# Patient Record
Sex: FEMALE | Race: ASIAN | NOT HISPANIC OR LATINO | URBAN - METROPOLITAN AREA
[De-identification: names, ages, dates, MRNs, and addresses within clinical notes are randomized per-mention and may not be internally consistent; named-entity substitution may affect disease eponyms.]

---
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
## 2023-01-04 ENCOUNTER — APPOINTMENT (OUTPATIENT)
Dept: PEDIATRIC NEPHROLOGY | Facility: CLINIC | Age: 18
End: 2023-01-04
Payer: MEDICAID

## 2023-01-04 VITALS
HEIGHT: 64.96 IN | DIASTOLIC BLOOD PRESSURE: 74 MMHG | WEIGHT: 152.5 LBS | HEART RATE: 90 BPM | BODY MASS INDEX: 25.41 KG/M2 | TEMPERATURE: 98.06 F | SYSTOLIC BLOOD PRESSURE: 107 MMHG

## 2023-01-04 PROCEDURE — 81003 URINALYSIS AUTO W/O SCOPE: CPT | Mod: QW

## 2023-01-04 PROCEDURE — 99214 OFFICE O/P EST MOD 30 MIN: CPT | Mod: 25

## 2023-01-04 NOTE — REASON FOR VISIT
[Follow-Up] : a follow-up visit for [Nephritis] : nephritis [Systemic Lupus Erythematosus] : systemic lupus erythematosus [Patient] : patient

## 2023-01-10 LAB
CREAT SPEC-SCNC: 217 MG/DL
CREAT/PROT UR: 0.1 RATIO
PROT UR-MCNC: 31 MG/DL

## 2023-01-11 ENCOUNTER — NON-APPOINTMENT (OUTPATIENT)
Age: 18
End: 2023-01-11

## 2023-01-17 NOTE — PHYSICAL EXAM
[Well Developed] : well developed [Well Nourished] : well nourished [Normal] : alert, oriented as age-appropriate, affect appropriate; no weakness, no facial asymmetry, moves all extremities normal gait- child older than 18 months [de-identified] : + striae on abdomen, arms, axillary areas; + acanthosis nigricans behind neck.

## 2023-01-30 ENCOUNTER — APPOINTMENT (OUTPATIENT)
Dept: PEDIATRIC RHEUMATOLOGY | Facility: CLINIC | Age: 18
End: 2023-01-30
Payer: MEDICAID

## 2023-01-30 VITALS
HEART RATE: 103 BPM | WEIGHT: 147.71 LBS | DIASTOLIC BLOOD PRESSURE: 74 MMHG | BODY MASS INDEX: 24.91 KG/M2 | HEIGHT: 64.61 IN | TEMPERATURE: 209.12 F | SYSTOLIC BLOOD PRESSURE: 109 MMHG

## 2023-01-30 DIAGNOSIS — G47.8 OTHER SLEEP DISORDERS: ICD-10-CM

## 2023-01-30 PROCEDURE — 99215 OFFICE O/P EST HI 40 MIN: CPT

## 2023-01-30 RX ORDER — PREDNISONE 2.5 MG/1
2.5 TABLET ORAL
Qty: 60 | Refills: 2 | Status: COMPLETED | COMMUNITY
Start: 2022-10-03 | End: 2023-01-30

## 2023-01-30 RX ORDER — FAMOTIDINE 20 MG/1
20 TABLET, FILM COATED ORAL
Qty: 30 | Refills: 5 | Status: COMPLETED | COMMUNITY
Start: 2022-10-12 | End: 2023-01-30

## 2023-01-31 LAB
ALBUMIN SERPL ELPH-MCNC: 4.2 G/DL
ALP BLD-CCNC: 48 U/L
ALT SERPL-CCNC: 9 U/L
ANION GAP SERPL CALC-SCNC: 13 MMOL/L
AST SERPL-CCNC: 12 U/L
BASOPHILS # BLD AUTO: 0 K/UL
BASOPHILS NFR BLD AUTO: 0 %
BILIRUB SERPL-MCNC: 0.2 MG/DL
BUN SERPL-MCNC: 10 MG/DL
C3 SERPL-MCNC: 157 MG/DL
C4 SERPL-MCNC: 32 MG/DL
CALCIUM SERPL-MCNC: 9.8 MG/DL
CHLORIDE SERPL-SCNC: 105 MMOL/L
CO2 SERPL-SCNC: 20 MMOL/L
CREAT SERPL-MCNC: 0.69 MG/DL
CRP SERPL-MCNC: 7 MG/L
EGFR: 128 ML/MIN/1.73M2
EOSINOPHIL # BLD AUTO: 0.01 K/UL
EOSINOPHIL NFR BLD AUTO: 0.2 %
ERYTHROCYTE [SEDIMENTATION RATE] IN BLOOD BY WESTERGREN METHOD: 19 MM/HR
GLUCOSE SERPL-MCNC: 91 MG/DL
HCT VFR BLD CALC: 34.8 %
HGB BLD-MCNC: 10.5 G/DL
IMM GRANULOCYTES NFR BLD AUTO: 0.3 %
LYMPHOCYTES # BLD AUTO: 1.67 K/UL
LYMPHOCYTES NFR BLD AUTO: 26.2 %
MAN DIFF?: NORMAL
MCHC RBC-ENTMCNC: 24.2 PG
MCHC RBC-ENTMCNC: 30.2 GM/DL
MCV RBC AUTO: 80.4 FL
MONOCYTES # BLD AUTO: 0.38 K/UL
MONOCYTES NFR BLD AUTO: 6 %
NEUTROPHILS # BLD AUTO: 4.3 K/UL
NEUTROPHILS NFR BLD AUTO: 67.3 %
PLATELET # BLD AUTO: 448 K/UL
POTASSIUM SERPL-SCNC: 4.1 MMOL/L
PROT SERPL-MCNC: 6.4 G/DL
RBC # BLD: 4.33 M/UL
RBC # FLD: 13.3 %
SODIUM SERPL-SCNC: 139 MMOL/L
WBC # FLD AUTO: 6.38 K/UL

## 2023-02-01 ENCOUNTER — APPOINTMENT (OUTPATIENT)
Dept: GASTROENTEROLOGY | Facility: CLINIC | Age: 18
End: 2023-02-01
Payer: MEDICAID

## 2023-02-01 ENCOUNTER — NON-APPOINTMENT (OUTPATIENT)
Age: 18
End: 2023-02-01

## 2023-02-01 VITALS
DIASTOLIC BLOOD PRESSURE: 75 MMHG | SYSTOLIC BLOOD PRESSURE: 122 MMHG | HEART RATE: 93 BPM | TEMPERATURE: 207.86 F | WEIGHT: 145 LBS | HEIGHT: 64.61 IN | BODY MASS INDEX: 24.45 KG/M2 | OXYGEN SATURATION: 99 %

## 2023-02-01 DIAGNOSIS — Z78.9 OTHER SPECIFIED HEALTH STATUS: ICD-10-CM

## 2023-02-01 LAB — DSDNA AB SER-ACNC: <12 IU/ML

## 2023-02-01 PROCEDURE — 99204 OFFICE O/P NEW MOD 45 MIN: CPT

## 2023-02-01 NOTE — ASSESSMENT
[FreeTextEntry1] : Impression:\par 1. Nausea/vomiting - differential includes gastroparesis, intraluminal obstructive process, eosinophilic gastroenteritis, vs less likely lupus enteritis\par 2. SLE on immunosuppression\par \par Plan:\par -Zofran PRN as it worked in ER IV; advised patient to use it PRN\par -Small meals throughout the day\par -Will plan first for EGD to r/o structural lesions, then gastric emptying to r/o gastroparesis\par -Risks and benefits of upper endoscopy including but not limited to bleeding, infection, missed lesions, perforation, injury to internal organs, anesthesia/drug side effects were discussed with patient. All questions answered. Patient is agreeable to the procedure. Advised patient that if she's nauseous the day of exam, she might require general anesthesia\par -Obtain also CT A/P to rule out small bowel/colonic thickening/inflammation/signs of enteritis

## 2023-02-01 NOTE — HISTORY OF PRESENT ILLNESS
[FreeTextEntry1] : This is a 17 year old female with SLE diagnosed 11/2020, lupus nephritis, HTN, who presents for evaluation of nausea/vomiting.\par \par The patient is referred by rheumatology for evaluation of GERD, nausea. For the last 4-5 months, she has been having persistent nausea and vomiting. She reports the nausea is all day long, and she feels nauseous even in the morning. It has been more at night, around 5PM. She takes her meds mostly in the morning, and she's nauseous even before taking the mycophenolate. She reports associated diarrhea. She reports recent very dark stools, twice a day. It's watery. No hematochezia. She might have lost 10 lb recently in the last few months. No dysphagia. She just don't like eating because of the nausea. No abdominal pain. No headaches, double vision. She has never had endoscopic evaluation before. She was given Pepcid and omeprazole, Zofran. Zofran helped for 5 hours if given IV. No change with the Pepcid/PPI. No recent medication changes, she was changed to the coated mycophenolate which did not help. No supplements, no OTC NSAIDs. No marijuana use. \par \par 1/30/23: CRP 7\par Na 139, K 4.1, Cl 105, bicarb 20, glucose 91, BUN 10, Cr 0.69, T protein 6.4, albumin 4.2, Ca 9.8,  Tbili 0.2, AST 12, ALT 9, ALP 48\par WBC 6.3, hgb 10.5, Hct 34.8, \par ESR 19\par C3 157\par C4 32\par Anti DsDNA <12 [de-identified] : Abd U/S 4/3/21:\par FINDINGS:\par \par Liver: Within normal limits.\par Bile ducts: Normal caliber. Common bile duct measures 3 mm.\par Gallbladder: Within normal limits.\par Pancreas: Visualized portions are within normal limits.\par Spleen: 9.53 cm. Within normal limits.\par Right kidney: 11.1 cm. No hydronephrosis.\par Left kidney: 10.8 cm.  No hydronephrosis.\par Ascites: None.\par Aorta and IVC: Visualized portions are within normal limits.\par \par IMPRESSION:\par \par No acute pathology.\par  [de-identified] : CT angio head 12/13/20:\par FINDINGS:\par \par Head CT: There is no acute intracranial hemorrhage, mass effect, shift of the midline structures, herniation, extra-axial fluid collection, or hydrocephalus.\par \par Scattered mucosal thickening and opacification are seen throughout the paranasal sinuses. The mastoid air cells are clear. The orbits are unremarkable. The calvarium is intact.\par \par CT venogram head: The superior, inferior, straight, confluence of, transverse, and sigmoid sinuses are patent. There is no evidence of venous sinus thrombosis. A rounded low-attenuation filling defect within the distal left transverse sinus is compatible with an arachnoid granulation.\par \par IMPRESSION:\par \par Head CT: No acute intracranial hemorrhage, mass effect, or shift of the midline structures.\par \par CT venogram head: No evidence of venous sinus thrombosis.

## 2023-02-01 NOTE — PHYSICAL EXAM
[Alert] : alert [Normal Voice/Communication] : normal voice/communication [Sclera] : the sclera and conjunctiva were normal [Hearing Threshold Finger Rub Not [place]] : hearing was normal [Normal Appearance] : the appearance of the neck was normal [No Respiratory Distress] : no respiratory distress [No Acc Muscle Use] : no accessory muscle use [Respiration, Rhythm And Depth] : normal respiratory rhythm and effort [Heart Rate And Rhythm] : heart rate was normal and rhythm regular [Normal S1, S2] : normal S1 and S2 [Bowel Sounds] : normal bowel sounds [Abdomen Tenderness] : non-tender [No Masses] : no abdominal mass palpated [Abdomen Soft] : soft [No Spinal Tenderness] : no spinal tenderness [Abnormal Walk] : normal gait [Cranial Nerves Intact] : cranial nerves 2-12 were intact [No Focal Deficits] : no focal deficits [Oriented To Time, Place, And Person] : oriented to person, place, and time [Normal Affect] : the affect was normal [de-identified] : +striae [de-identified] : +striae on arms

## 2023-02-01 NOTE — CONSULT LETTER
[Dear  ___] : Dear  [unfilled], [Consult Letter:] : I had the pleasure of evaluating your patient, [unfilled]. [Please see my note below.] : Please see my note below. [Consult Closing:] : Thank you very much for allowing me to participate in the care of this patient.  If you have any questions, please do not hesitate to contact me. [Sincerely,] : Sincerely, [FreeTextEntry2] : Dr. Cindy Brown [FreeTextEntry3] : Chapo Kim MD\par [facility]\par  of Medicine, [facility] [place]\par Tel: [phone]\par Fax: [phone]\par

## 2023-02-06 LAB — SARS-COV-2 N GENE NPH QL NAA+PROBE: NOT DETECTED

## 2023-02-07 ENCOUNTER — RESULT REVIEW (OUTPATIENT)
Age: 18
End: 2023-02-07

## 2023-02-07 ENCOUNTER — OUTPATIENT (OUTPATIENT)
Dept: OUTPATIENT SERVICES | Facility: HOSPITAL | Age: 18
LOS: 1 days | End: 2023-02-07
Payer: MEDICAID

## 2023-02-07 ENCOUNTER — APPOINTMENT (OUTPATIENT)
Dept: GASTROENTEROLOGY | Facility: HOSPITAL | Age: 18
End: 2023-02-07

## 2023-02-07 ENCOUNTER — TRANSCRIPTION ENCOUNTER (OUTPATIENT)
Age: 18
End: 2023-02-07

## 2023-02-07 VITALS
RESPIRATION RATE: 20 BRPM | DIASTOLIC BLOOD PRESSURE: 91 MMHG | HEART RATE: 82 BPM | SYSTOLIC BLOOD PRESSURE: 123 MMHG | OXYGEN SATURATION: 100 %

## 2023-02-07 VITALS
HEART RATE: 67 BPM | OXYGEN SATURATION: 100 % | HEIGHT: 66 IN | WEIGHT: 149.91 LBS | RESPIRATION RATE: 15 BRPM | TEMPERATURE: 97 F | SYSTOLIC BLOOD PRESSURE: 108 MMHG | DIASTOLIC BLOOD PRESSURE: 75 MMHG

## 2023-02-07 DIAGNOSIS — R11.2 NAUSEA WITH VOMITING, UNSPECIFIED: ICD-10-CM

## 2023-02-07 PROBLEM — G47.8 POOR SLEEP PATTERN: Status: ACTIVE | Noted: 2022-12-20

## 2023-02-07 PROCEDURE — 94640 AIRWAY INHALATION TREATMENT: CPT

## 2023-02-07 PROCEDURE — 43239 EGD BIOPSY SINGLE/MULTIPLE: CPT

## 2023-02-07 PROCEDURE — 88305 TISSUE EXAM BY PATHOLOGIST: CPT

## 2023-02-07 PROCEDURE — 88305 TISSUE EXAM BY PATHOLOGIST: CPT | Mod: 26

## 2023-02-07 RX ORDER — HYDROXYCHLOROQUINE SULFATE 200 MG
1 TABLET ORAL
Qty: 0 | Refills: 0 | DISCHARGE

## 2023-02-07 RX ORDER — MYCOPHENOLATE MOFETIL 250 MG/1
2 CAPSULE ORAL
Qty: 0 | Refills: 0 | DISCHARGE

## 2023-02-07 RX ORDER — SODIUM CHLORIDE 9 MG/ML
500 INJECTION INTRAMUSCULAR; INTRAVENOUS; SUBCUTANEOUS
Refills: 0 | Status: COMPLETED | OUTPATIENT
Start: 2023-02-07 | End: 2023-02-07

## 2023-02-07 RX ORDER — LIDOCAINE HCL 20 MG/ML
4 VIAL (ML) INJECTION ONCE
Refills: 0 | Status: COMPLETED | OUTPATIENT
Start: 2023-02-07 | End: 2023-02-07

## 2023-02-07 RX ADMIN — Medication 4 MILLILITER(S): at 09:20

## 2023-02-07 RX ADMIN — SODIUM CHLORIDE 30 MILLILITER(S): 9 INJECTION INTRAMUSCULAR; INTRAVENOUS; SUBCUTANEOUS at 09:19

## 2023-02-07 NOTE — ASU DISCHARGE PLAN (ADULT/PEDIATRIC) - NS MD DC FALL RISK RISK
For information on Fall & Injury Prevention, visit: [email or web address] OR  [email or web address] OR  https://www.cdc.gov/steadi/patient.html

## 2023-02-07 NOTE — ASU PATIENT PROFILE, ADULT - FALL HARM RISK - UNIVERSAL INTERVENTIONS
Bed in lowest position, wheels locked, appropriate side rails in place/Call bell, personal items and telephone in reach/Instruct patient to call for assistance before getting out of bed or chair/Non-slip footwear when patient is out of bed/[place] to call system/Physically safe environment - no spills, clutter or unnecessary equipment/Purposeful Proactive Rounding/Room/bathroom lighting operational, light cord in reach

## 2023-02-07 NOTE — PRE PROCEDURE NOTE - PRE PROCEDURE EVALUATION
Attending Physician: Andi Kim MD    Procedure: EGD    Indication for Procedure: Nausea/vomiting  ________________________________________________________  PAST MEDICAL & SURGICAL HISTORY:  Systemic lupus erythematosus, unspecified SLE type, unspecified organ involvement status      SHERWIN positive      No significant past surgical history        ALLERGIES:  No Known Allergies    HOME MEDICATIONS:  mycophenolate mofetil 500 mg oral tablet: 2 tab(s) orally 2 times a day  Plaquenil 200 mg oral tablet: 1 tab(s) orally once a day    AICD/PPM: [ ] yes   [ x] no    PERTINENT LAB DATA:                      PHYSICAL EXAMINATION:    Height (cm): 167.6  Weight (kg): 68  BMI (kg/m2): 24.2  BSA (m2): 1.77T(C): 35.9  HR: 67  BP: 108/75  RR: 15  SpO2: 100%    Constitutional: NAD  HEENT: PERRLA, EOMI,    Neck:  No JVD  Respiratory: CTAB/L  Cardiovascular: S1 and S2  Gastrointestinal: BS+, soft, NT/ND  Extremities: No peripheral edema  Neurological: A/O x 3, no focal deficits  Psychiatric: Normal mood, normal affect  Skin: No rashes    ASA Class: I [ ]  II [x ]  III [ ]  IV [ ]    COMMENTS:    The patient is a suitable candidate for the planned procedure unless box checked [ ]  No, explain:

## 2023-02-08 NOTE — HISTORY OF PRESENT ILLNESS
[SHERWIN] : SHERWIN [ds-DNA] : ds-DNA [SSA] : SSA [FreeTextEntry1] : Luisa is here for follow-up today.\par \par Reports continued feeling of nausea in AM, usually is unable to eat until 5PM. These symptoms only occur on weekdays; on weekends, she awakens at noon and is fine. Changed to Myfortic from CellCept, thinks she is doing well with her medications in terms of these symptoms [myfortic is enteric coated]. Still has periods where she is ‘gagging’ during the day and has a decrease in appetite as she does not want to exacerbate these symptoms. Denies dysphagia, reflux symptoms/acidic after taste, any food triggers, emesis, diarrhea, constipation, or abdominal pain. \par \par Otherwise, reports feeling well without any additional symptoms.\par \par Current Meds - reports compliance with her medications \par Prednisone 2.5mg/dose\par HCQ 300mg qD\par Myfortic 720mg BID (no nausea symptoms around dosing)\par Lisinopril 30mg \par Ca-Vit D BID\par Vit D once a week \par Famotidine qD\par \par Denies any recent illnesses, COVID exposures, trauma/injury, fevers, rashes, oral lesions, Raynaud's, headaches, vision changes, chest pain, difficulty breathing, palpitations, hematuria, hematochezia, weakness, joint symptoms, or peripheral edema. Reports wearing sunscreen.\par  [SLEDXDATE] : 11/2020

## 2023-02-08 NOTE — PHYSICAL EXAM
[PERRLA] : DENILSON [Eyelids] : normal eyelids [Pupils] : pupils were equal and round [Gums] : normal gums [Mucosa] : moist and pink mucosa [Palate] : normal palate [S1, S2 Present] : S1, S2 present [Cardiac Auscultation] : normal cardiac auscultation  [Respiratory Effort] : normal respiratory effort [Clear to auscultation] : clear to auscultation [Soft] : soft [NonTender] : non tender [Non Distended] : non distended [Normal Bowel Sounds] : normal bowel sounds [No Hepatosplenomegaly] : no hepatosplenomegaly [No Abnormal Lymph Nodes Palpated] : no abnormal lymph nodes palpated [Muscle Strength] : normal muscle strength [Range Of Motion] : full range of motion [Gait] : normal gait [Cranial nerves grossly intact] : cranial nerves grossly intact [Intact Judgement] : intact judgement  [Insight Insight] : intact insight [1] : 1 [Thumbs bend back to reach forearm] : thumbs bend back to reach forearm [Hyperextension of elbows] : hyperextension of elbows  [Hyperextension of knees] : hyperextension of knees [Pronated flat feet] : pronated flat feet [Acute distress] : no acute distress [Rash] : no rash [Malar Erythema] : no malar erythema [Erythematous Conjunctiva] : nonerythematous conjunctiva [Erythematous Oropharynx] : nonerythematous oropharynx [Lesions] : no lesions [Murmurs] : no murmurs [Peripheral Edema] : no peripheral edema  [Joint effusions] : no joint effusions [FreeTextEntry1] : well-appearing, pleasant [de-identified] : +cushingoid features, moon facies; striae present along axillae and abdomen [de-identified] : no objective arthritis [NumbJointsActiveArthritis] : 0 [NumbJointsLimitedMotion] : 0 [de-identified] : FROM C-spine [de-identified] : Negative ONELIA bilaterally  [de-identified] : no gross discrepancy

## 2023-02-08 NOTE — CONSULT LETTER
[Dear  ___] : Dear  [unfilled], [Courtesy Letter:] : I had the pleasure of seeing your patient, [unfilled], in my office today. [Please see my note below.] : Please see my note below. [Consult Closing:] : Thank you very much for allowing me to participate in the care of this patient.  If you have any questions, please do not hesitate to contact me. [Sincerely,] : Sincerely, [FreeTextEntry2] : Dr. Camille Tamez\par [address] [facility] [facility] \[place] [address]\par [phone] [FreeTextEntry3] : Cindy Brown DO\par Attending Physician, Pediatric Rheumatology\par [facility]\par

## 2023-02-08 NOTE — REASON FOR VISIT
[Follow-Up: _____] : [unfilled] is  being seen for a [unfilled] follow-up visit [Patient] : patient [Other: _____] : [unfilled] [FreeTextEntry1] : f

## 2023-02-08 NOTE — REVIEW OF SYSTEMS
[NI] : Endocrine [Nl] : Hematologic/Lymphatic [Date of Last Ophto Exam: _____] : the patient's last Ophthalmology exam was [unfilled] [Menarche] : ~T menarche [Appropriate Age Development] : development appropriate for age [Wgt Loss (___ Lbs)] : recent [unfilled] lb weight loss [Change in Appetite] : change in appetite [Vomiting] : vomiting [Decrease In Appetite] : decreased appetite [Diarrhea] : no diarrhea [Constipation] : no constipation [Irregular Periods] : no irregular periods [Smokers in Home] : no one in home smokes [FreeTextEntry3] : abdominal and UE striae [FreeTextEntry2] : nausea

## 2023-02-10 LAB — SURGICAL PATHOLOGY STUDY: SIGNIFICANT CHANGE UP

## 2023-03-07 ENCOUNTER — APPOINTMENT (OUTPATIENT)
Dept: PEDIATRIC NEPHROLOGY | Facility: CLINIC | Age: 18
End: 2023-03-07

## 2023-03-13 ENCOUNTER — APPOINTMENT (OUTPATIENT)
Dept: PEDIATRIC RHEUMATOLOGY | Facility: CLINIC | Age: 18
End: 2023-03-13

## 2023-03-22 NOTE — ED ADULT NURSE NOTE - CAS TRG GEN SKIN COLOR
Normal for race What Type Of Note Output Would You Prefer (Optional)?: Bullet Format How Severe Is Your Skin Lesion?: moderate Is This A New Presentation, Or A Follow-Up?: Mole

## 2023-04-19 NOTE — ED PROVIDER NOTE - ATTENDING CONTRIBUTION TO CARE
home
I performed a history and physical exam of the patient and discussed their management with the resident. I reviewed the resident's note and agree with the documented findings and plan of care.  Kasandra Ohara MD

## 2023-07-10 ENCOUNTER — APPOINTMENT (OUTPATIENT)
Dept: PEDIATRIC RHEUMATOLOGY | Facility: CLINIC | Age: 18
End: 2023-07-10
Payer: MEDICAID

## 2023-07-10 VITALS
DIASTOLIC BLOOD PRESSURE: 69 MMHG | HEART RATE: 88 BPM | SYSTOLIC BLOOD PRESSURE: 102 MMHG | WEIGHT: 125 LBS | BODY MASS INDEX: 20.58 KG/M2 | HEIGHT: 65.2 IN

## 2023-07-10 DIAGNOSIS — Z87.898 PERSONAL HISTORY OF OTHER SPECIFIED CONDITIONS: ICD-10-CM

## 2023-07-10 DIAGNOSIS — R63.4 ABNORMAL WEIGHT LOSS: ICD-10-CM

## 2023-07-10 DIAGNOSIS — R19.8 OTHER SPECIFIED SYMPTOMS AND SIGNS INVOLVING THE DIGESTIVE SYSTEM AND ABDOMEN: ICD-10-CM

## 2023-07-10 DIAGNOSIS — Z91.14 PATIENT'S OTHER NONCOMPLIANCE WITH MEDICATION REGIMEN: ICD-10-CM

## 2023-07-10 PROCEDURE — 99215 OFFICE O/P EST HI 40 MIN: CPT

## 2023-07-11 LAB
25(OH)D3 SERPL-MCNC: 43.8 NG/ML
ALBUMIN SERPL ELPH-MCNC: 4.4 G/DL
ALP BLD-CCNC: 70 U/L
ALT SERPL-CCNC: 7 U/L
ANION GAP SERPL CALC-SCNC: 11 MMOL/L
AST SERPL-CCNC: 13 U/L
BILIRUB SERPL-MCNC: 0.4 MG/DL
BUN SERPL-MCNC: 10 MG/DL
C3 SERPL-MCNC: 156 MG/DL
C4 SERPL-MCNC: 28 MG/DL
CALCIUM SERPL-MCNC: 9.8 MG/DL
CARDIOLIPIN AB SER IA-ACNC: NEGATIVE
CHLORIDE SERPL-SCNC: 105 MMOL/L
CO2 SERPL-SCNC: 23 MMOL/L
CONFIRM: 32.7 SEC
CREAT SERPL-MCNC: 0.64 MG/DL
CRP SERPL-MCNC: 19 MG/L
DRVVT IMM 1:2 NP PPP: NORMAL
DRVVT SCREEN TO CONFIRM RATIO: 1.07 RATIO
DSDNA AB SER-ACNC: 20 IU/ML
EGFR: 130 ML/MIN/1.73M2
ENA RNP AB SER IA-ACNC: <0.2 AL
ENA SM AB SER IA-ACNC: <0.2 AL
ENA SS-A AB SER IA-ACNC: 3.6 AL
ENA SS-B AB SER IA-ACNC: <0.2 AL
ERYTHROCYTE [SEDIMENTATION RATE] IN BLOOD BY WESTERGREN METHOD: 30 MM/HR
FERRITIN SERPL-MCNC: 50 NG/ML
GLUCOSE SERPL-MCNC: 92 MG/DL
IRON SATN MFR SERPL: 5 %
IRON SERPL-MCNC: 17 UG/DL
POTASSIUM SERPL-SCNC: 3.8 MMOL/L
PROT SERPL-MCNC: 7.4 G/DL
SCREEN DRVVT: 41.2 SEC
SODIUM SERPL-SCNC: 139 MMOL/L
THYROGLOB AB SERPL-ACNC: <20 IU/ML
THYROPEROXIDASE AB SERPL IA-ACNC: <10 IU/ML
TIBC SERPL-MCNC: 327 UG/DL
TSH SERPL-ACNC: 1.4 UIU/ML
UIBC SERPL-MCNC: 310 UG/DL

## 2023-07-12 PROBLEM — R19.8 EPISODE OF GAGGING: Status: RESOLVED | Noted: 2022-12-20 | Resolved: 2023-07-12

## 2023-07-12 PROBLEM — Z87.898 HISTORY OF NAUSEA: Status: RESOLVED | Noted: 2022-10-12 | Resolved: 2023-07-12

## 2023-07-12 PROBLEM — R63.4 WEIGHT LOSS, UNINTENTIONAL: Status: ACTIVE | Noted: 2023-07-12

## 2023-07-12 PROBLEM — Z87.898 HISTORY OF FATIGUE: Status: RESOLVED | Noted: 2022-12-20 | Resolved: 2023-07-12

## 2023-07-12 PROBLEM — Z87.898 HISTORY OF NAUSEA AND VOMITING: Status: RESOLVED | Noted: 2023-02-01 | Resolved: 2023-07-12

## 2023-07-12 PROBLEM — Z91.14 NONCOMPLIANCE WITH MEDICATIONS: Status: ACTIVE | Noted: 2023-07-12

## 2023-07-12 LAB
B2 GLYCOPROT1 IGA SERPL IA-ACNC: <5 SAU
B2 GLYCOPROT1 IGG SER-ACNC: <5 SGU
B2 GLYCOPROT1 IGM SER-ACNC: <5 SMU
CARDIOLIPIN IGM SER-MCNC: 5.8 MPL
DEPRECATED CARDIOLIPIN IGA SER: <5 APL

## 2023-07-12 RX ORDER — PREDNISONE 5 MG/1
5 TABLET ORAL
Qty: 100 | Refills: 2 | Status: COMPLETED | COMMUNITY
Start: 2022-05-12 | End: 2023-07-12

## 2023-07-12 RX ORDER — ONDANSETRON 4 MG/1
4 TABLET ORAL
Qty: 15 | Refills: 1 | Status: COMPLETED | COMMUNITY
Start: 2022-12-12 | End: 2023-07-12

## 2023-07-12 RX ORDER — HYDROXYCHLOROQUINE SULFATE 200 MG/1
200 TABLET, FILM COATED ORAL DAILY
Qty: 135 | Refills: 2 | Status: ACTIVE | COMMUNITY
Start: 2021-04-05 | End: 1900-01-01

## 2023-07-12 RX ORDER — ONDANSETRON 4 MG/1
4 TABLET, ORALLY DISINTEGRATING ORAL EVERY 6 HOURS
Qty: 60 | Refills: 2 | Status: COMPLETED | COMMUNITY
Start: 2023-02-01 | End: 2023-07-12

## 2023-07-12 RX ORDER — CHOLECALCIFEROL (VITAMIN D3) 1250 MCG
1.25 MG CAPSULE ORAL
Qty: 6 | Refills: 2 | Status: COMPLETED | COMMUNITY
Start: 2022-04-14 | End: 2023-07-12

## 2023-07-12 NOTE — PHYSICAL EXAM
[PERRLA] : DENILSON [Eyelids] : normal eyelids [Pupils] : pupils were equal and round [Gums] : normal gums [Mucosa] : moist and pink mucosa [Palate] : normal palate [S1, S2 Present] : S1, S2 present [Cardiac Auscultation] : normal cardiac auscultation  [Respiratory Effort] : normal respiratory effort [Clear to auscultation] : clear to auscultation [Soft] : soft [NonTender] : non tender [Non Distended] : non distended [Normal Bowel Sounds] : normal bowel sounds [No Hepatosplenomegaly] : no hepatosplenomegaly [No Abnormal Lymph Nodes Palpated] : no abnormal lymph nodes palpated [Muscle Strength] : normal muscle strength [Range Of Motion] : full range of motion [Gait] : normal gait [Cranial nerves grossly intact] : cranial nerves grossly intact [Intact Judgement] : intact judgement  [Insight Insight] : intact insight [1] : 1 [Thumbs bend back to reach forearm] : thumbs bend back to reach forearm [Hyperextension of elbows] : hyperextension of elbows  [Hyperextension of knees] : hyperextension of knees [Pronated flat feet] : pronated flat feet [Acute distress] : no acute distress [Rash] : no rash [Malar Erythema] : no malar erythema [Erythematous Conjunctiva] : nonerythematous conjunctiva [Erythematous Oropharynx] : nonerythematous oropharynx [Lesions] : no lesions [Murmurs] : no murmurs [Peripheral Edema] : no peripheral edema  [Joint effusions] : no joint effusions [FreeTextEntry1] : well-appearing, weight loss apparent  [de-identified] : striae present along axillae, upper extremities, and abdomen [de-identified] : no objective arthritis [NumbJointsActiveArthritis] : 0 [NumbJointsLimitedMotion] : 0 [de-identified] : FROM C-spine [de-identified] : Negative ONELIA bilaterally  [de-identified] : no gross discrepancy

## 2023-07-12 NOTE — CONSULT LETTER
[Dear  ___] : Dear  [unfilled], [Courtesy Letter:] : I had the pleasure of seeing your patient, [unfilled], in my office today. [Please see my note below.] : Please see my note below. [Consult Closing:] : Thank you very much for allowing me to participate in the care of this patient.  If you have any questions, please do not hesitate to contact me. [Sincerely,] : Sincerely, [FreeTextEntry2] : Camille Tamez MD\par [address] [facility] [address]\[place] [address]\par [phone] [FreeTextEntry3] : Cindy Brown DO\par Attending Physician, Pediatric Rheumatology\par [facility]\par

## 2023-07-12 NOTE — IMMUNIZATIONS
[Immunizations are up to date] : Immunizations are up to date [Records maintained by PMLINDY] : Records maintained by JOELLEN [FreeTextEntry1] : COVID series completed 4-5/2021; no booster

## 2023-07-12 NOTE — HISTORY OF PRESENT ILLNESS
[SHERWIN] : SHERWIN [ds-DNA] : ds-DNA [SSA] : SSA [FreeTextEntry1] : Luisa is here for follow-up today with her parents. \par \par Luisa reports feeling well, although she has had unintentional 20lb weight loss since our last appt in 1/2023! Denies any excessive exercising or restrictive eating habits...although reports eating less than her usual baseline over the past few months due to feeling hungry easily. Does have breakfast and late lunch, usually won't have dinner because she is feeling full. Denies any more episodes of nausea or vomiting. She does admit to discontinuing all her medications for 2 months and her symptoms seemed to resolve at that time. Has restarted her HCQ ~2 weeks ago. \par \par Seen by GI 2/2023 - who performed an endoscopy, which showed chronic gastritis. Recommended further testing, but Luisa did not return due to busy schedule with schooling. Her symptoms resolved, so she had not returned for further evaluation. \par ori Has had a few incidences of feeling dizzy if standing for a long time..+/- associated blurry vision when this occurs; symptoms resolve with rest or changing position. Did have two episodes of fainting when out with friends, but attributes this to a very hot environment and being dehydrated and overheated. No episodes of LOC.  \par \par Current Meds: HCQ 300mg qD\par \par Denies any recent illnesses, trauma/injury, fevers, rashes, oral lesions, Raynaud's, headaches, vision changes, chest pain, difficulty breathing, palpitations, abdominal pain, n/v/d/c, dysphagia, hematuria, hematochezia, weakness, joint symptoms, or peripheral edema. Reports wearing sunscreen.\par  [SLEDXDATE] : 11/2020

## 2023-07-12 NOTE — REVIEW OF SYSTEMS
[NI] : Endocrine [Nl] : Hematologic/Lymphatic [Wgt Loss (___ Lbs)] : recent [unfilled] lb weight loss [Date of Last Ophto Exam: _____] : the patient's last Ophthalmology exam was [unfilled] [Change in Appetite] : change in appetite [Decrease In Appetite] : decreased appetite [Menarche] : ~T menarche [Appropriate Age Development] : development appropriate for age [Vomiting] : no vomiting [Diarrhea] : no diarrhea [Constipation] : no constipation [Irregular Periods] : no irregular periods [Smokers in Home] : no one in home smokes [FreeTextEntry3] : abdominal and UE striae

## 2023-07-12 NOTE — SOCIAL HISTORY
[Parent(s)] : parent(s) [___ Brothers] : [unfilled] brothers [College] : College [Sexually Active] : patient is not sexually active [FreeTextEntry1] : Attending [employer] and studying business - completed freshman year, driving to school; brother is getting engaged this Summer

## 2023-07-13 LAB — CARDIOLIPIN IGM SER-MCNC: 11 GPL

## 2023-07-19 ENCOUNTER — NON-APPOINTMENT (OUTPATIENT)
Age: 18
End: 2023-07-19

## 2023-07-19 DIAGNOSIS — D50.9 IRON DEFICIENCY ANEMIA, UNSPECIFIED: ICD-10-CM

## 2023-07-19 RX ORDER — LISINOPRIL 30 MG/1
30 TABLET ORAL DAILY
Qty: 30 | Refills: 5 | Status: COMPLETED | COMMUNITY
Start: 2022-04-06 | End: 2023-07-19

## 2023-07-19 RX ORDER — MYCOPHENILIC ACID 360 MG/1
360 TABLET, DELAYED RELEASE ORAL
Qty: 120 | Refills: 2 | Status: COMPLETED | COMMUNITY
Start: 2022-11-30 | End: 2023-07-19

## 2023-08-27 ENCOUNTER — EMERGENCY (EMERGENCY)
Facility: HOSPITAL | Age: 18
LOS: 1 days | Discharge: HOME | End: 2023-08-27
Attending: PERSONAL EMERGENCY RESPONSE ATTENDANT
Payer: MEDICAID

## 2023-08-27 VITALS
HEART RATE: 80 BPM | OXYGEN SATURATION: 99 % | RESPIRATION RATE: 18 BRPM | SYSTOLIC BLOOD PRESSURE: 112 MMHG | TEMPERATURE: 98 F | DIASTOLIC BLOOD PRESSURE: 77 MMHG

## 2023-08-27 VITALS
RESPIRATION RATE: 20 BRPM | SYSTOLIC BLOOD PRESSURE: 111 MMHG | OXYGEN SATURATION: 100 % | TEMPERATURE: 98 F | WEIGHT: 119.93 LBS | HEIGHT: 66 IN | HEART RATE: 82 BPM | DIASTOLIC BLOOD PRESSURE: 76 MMHG

## 2023-08-27 LAB
ALBUMIN SERPL ELPH-MCNC: 4.4 G/DL — SIGNIFICANT CHANGE UP (ref 3.3–5)
ALP SERPL-CCNC: 55 U/L — SIGNIFICANT CHANGE UP (ref 40–120)
ALT FLD-CCNC: 6 U/L — LOW (ref 10–45)
ANION GAP SERPL CALC-SCNC: 15 MMOL/L — SIGNIFICANT CHANGE UP (ref 5–17)
AST SERPL-CCNC: 13 U/L — SIGNIFICANT CHANGE UP (ref 10–40)
BASOPHILS # BLD AUTO: 0 K/UL — SIGNIFICANT CHANGE UP (ref 0–0.2)
BASOPHILS NFR BLD AUTO: 0 % — SIGNIFICANT CHANGE UP (ref 0–2)
BILIRUB SERPL-MCNC: 0.4 MG/DL — SIGNIFICANT CHANGE UP (ref 0.2–1.2)
BUN SERPL-MCNC: 9 MG/DL — SIGNIFICANT CHANGE UP (ref 7–23)
CALCIUM SERPL-MCNC: 9.7 MG/DL — SIGNIFICANT CHANGE UP (ref 8.4–10.5)
CHLORIDE SERPL-SCNC: 106 MMOL/L — SIGNIFICANT CHANGE UP (ref 96–108)
CO2 SERPL-SCNC: 18 MMOL/L — LOW (ref 22–31)
CREAT SERPL-MCNC: 0.56 MG/DL — SIGNIFICANT CHANGE UP (ref 0.5–1.3)
D DIMER BLD IA.RAPID-MCNC: <150 NG/ML DDU — SIGNIFICANT CHANGE UP
EGFR: 134 ML/MIN/1.73M2 — SIGNIFICANT CHANGE UP
ELLIPTOCYTES BLD QL SMEAR: SLIGHT — SIGNIFICANT CHANGE UP
EOSINOPHIL # BLD AUTO: 0 K/UL — SIGNIFICANT CHANGE UP (ref 0–0.5)
EOSINOPHIL NFR BLD AUTO: 0 % — SIGNIFICANT CHANGE UP (ref 0–6)
GLUCOSE SERPL-MCNC: 94 MG/DL — SIGNIFICANT CHANGE UP (ref 70–99)
HCG SERPL-ACNC: <2 MIU/ML — SIGNIFICANT CHANGE UP
HCT VFR BLD CALC: 32.5 % — LOW (ref 34.5–45)
HGB BLD-MCNC: 10.6 G/DL — LOW (ref 11.5–15.5)
LIDOCAIN IGE QN: 27 U/L — SIGNIFICANT CHANGE UP (ref 7–60)
LYMPHOCYTES # BLD AUTO: 0.98 K/UL — LOW (ref 1–3.3)
LYMPHOCYTES # BLD AUTO: 17.5 % — SIGNIFICANT CHANGE UP (ref 13–44)
MAGNESIUM SERPL-MCNC: 1.8 MG/DL — SIGNIFICANT CHANGE UP (ref 1.6–2.6)
MANUAL SMEAR VERIFICATION: SIGNIFICANT CHANGE UP
MCHC RBC-ENTMCNC: 24.4 PG — LOW (ref 27–34)
MCHC RBC-ENTMCNC: 32.6 GM/DL — SIGNIFICANT CHANGE UP (ref 32–36)
MCV RBC AUTO: 74.9 FL — LOW (ref 80–100)
MONOCYTES # BLD AUTO: 0.1 K/UL — SIGNIFICANT CHANGE UP (ref 0–0.9)
MONOCYTES NFR BLD AUTO: 1.8 % — LOW (ref 2–14)
NEUTROPHILS # BLD AUTO: 4.52 K/UL — SIGNIFICANT CHANGE UP (ref 1.8–7.4)
NEUTROPHILS NFR BLD AUTO: 80.7 % — HIGH (ref 43–77)
OVALOCYTES BLD QL SMEAR: SLIGHT — SIGNIFICANT CHANGE UP
PHOSPHATE SERPL-MCNC: 2 MG/DL — LOW (ref 2.5–4.5)
PLAT MORPH BLD: NORMAL — SIGNIFICANT CHANGE UP
PLATELET # BLD AUTO: 319 K/UL — SIGNIFICANT CHANGE UP (ref 150–400)
POIKILOCYTOSIS BLD QL AUTO: SLIGHT — SIGNIFICANT CHANGE UP
POTASSIUM SERPL-MCNC: 3.6 MMOL/L — SIGNIFICANT CHANGE UP (ref 3.5–5.3)
POTASSIUM SERPL-SCNC: 3.6 MMOL/L — SIGNIFICANT CHANGE UP (ref 3.5–5.3)
PROT SERPL-MCNC: 7.4 G/DL — SIGNIFICANT CHANGE UP (ref 6–8.3)
RBC # BLD: 4.34 M/UL — SIGNIFICANT CHANGE UP (ref 3.8–5.2)
RBC # FLD: 15.5 % — HIGH (ref 10.3–14.5)
RBC BLD AUTO: ABNORMAL
SODIUM SERPL-SCNC: 139 MMOL/L — SIGNIFICANT CHANGE UP (ref 135–145)
TROPONIN T, HIGH SENSITIVITY RESULT: <6 NG/L — SIGNIFICANT CHANGE UP (ref 0–51)
WBC # BLD: 5.6 K/UL — SIGNIFICANT CHANGE UP (ref 3.8–10.5)
WBC # FLD AUTO: 5.6 K/UL — SIGNIFICANT CHANGE UP (ref 3.8–10.5)

## 2023-08-27 PROCEDURE — 99284 EMERGENCY DEPT VISIT MOD MDM: CPT

## 2023-08-27 PROCEDURE — 93005 ELECTROCARDIOGRAM TRACING: CPT

## 2023-08-27 PROCEDURE — 71046 X-RAY EXAM CHEST 2 VIEWS: CPT | Mod: 26

## 2023-08-27 PROCEDURE — 83735 ASSAY OF MAGNESIUM: CPT

## 2023-08-27 PROCEDURE — 85379 FIBRIN DEGRADATION QUANT: CPT

## 2023-08-27 PROCEDURE — 99285 EMERGENCY DEPT VISIT HI MDM: CPT | Mod: 25

## 2023-08-27 PROCEDURE — 87086 URINE CULTURE/COLONY COUNT: CPT

## 2023-08-27 PROCEDURE — 85025 COMPLETE CBC W/AUTO DIFF WBC: CPT

## 2023-08-27 PROCEDURE — 84702 CHORIONIC GONADOTROPIN TEST: CPT

## 2023-08-27 PROCEDURE — 84100 ASSAY OF PHOSPHORUS: CPT

## 2023-08-27 PROCEDURE — 96374 THER/PROPH/DIAG INJ IV PUSH: CPT

## 2023-08-27 PROCEDURE — 84484 ASSAY OF TROPONIN QUANT: CPT

## 2023-08-27 PROCEDURE — 71046 X-RAY EXAM CHEST 2 VIEWS: CPT

## 2023-08-27 PROCEDURE — 83690 ASSAY OF LIPASE: CPT

## 2023-08-27 PROCEDURE — 96375 TX/PRO/DX INJ NEW DRUG ADDON: CPT

## 2023-08-27 PROCEDURE — 81001 URINALYSIS AUTO W/SCOPE: CPT

## 2023-08-27 PROCEDURE — 80053 COMPREHEN METABOLIC PANEL: CPT

## 2023-08-27 RX ORDER — KETOROLAC TROMETHAMINE 30 MG/ML
15 SYRINGE (ML) INJECTION ONCE
Refills: 0 | Status: DISCONTINUED | OUTPATIENT
Start: 2023-08-27 | End: 2023-08-27

## 2023-08-27 RX ORDER — SODIUM CHLORIDE 9 MG/ML
1000 INJECTION INTRAMUSCULAR; INTRAVENOUS; SUBCUTANEOUS ONCE
Refills: 0 | Status: COMPLETED | OUTPATIENT
Start: 2023-08-27 | End: 2023-08-27

## 2023-08-27 RX ORDER — FAMOTIDINE 10 MG/ML
20 INJECTION INTRAVENOUS ONCE
Refills: 0 | Status: COMPLETED | OUTPATIENT
Start: 2023-08-27 | End: 2023-08-27

## 2023-08-27 RX ADMIN — Medication 15 MILLIGRAM(S): at 23:12

## 2023-08-27 RX ADMIN — FAMOTIDINE 20 MILLIGRAM(S): 10 INJECTION INTRAVENOUS at 21:54

## 2023-08-27 RX ADMIN — SODIUM CHLORIDE 1000 MILLILITER(S): 9 INJECTION INTRAMUSCULAR; INTRAVENOUS; SUBCUTANEOUS at 21:54

## 2023-08-27 RX ADMIN — SODIUM CHLORIDE 1000 MILLILITER(S): 9 INJECTION INTRAMUSCULAR; INTRAVENOUS; SUBCUTANEOUS at 22:22

## 2023-08-27 NOTE — ED PROVIDER NOTE - PATIENT PORTAL LINK FT
You can access the FollowMyHealth Patient Portal offered by [facility] by registering at the following website: [email or web address]. By joining [employer]’s FollowMyHealth portal, you will also be able to view your health information using other applications (apps) compatible with our system.

## 2023-08-27 NOTE — ED PROVIDER NOTE - NSFOLLOWUPINSTRUCTIONS_ED_ALL_ED_FT
You have been evaluated in the Emergency Department today for chest pain, vomiting and shortness of breath. Your blood work, chest xray and ECG looked okay.    For pain, you can take TYLENOL/ACETAMINOPHEN up to 4,000mg a day for your symptoms in four divided doses.     You can also take Pepcid over the counter. Please follow instructions on the packaging.     Please also  and take Zofran as needed for nausea.    Please schedule an appointment with your primary care physician within 2-3 days.    Return to the Emergency Department if you experience worsening or uncontrolled pain, fevers 100.4°F or greater, recurrent vomiting, inability to tolerate food or fluids by mouth, bloody stools or vomit, black or tarry stools, or any other concerning symptoms.    Thank you for choosing us for your care.

## 2023-08-27 NOTE — ED PROVIDER NOTE - OBJECTIVE STATEMENT
18 female with past medical history of lupus, gastritis presenting with chest pressure, vomiting, fatigue.  Patient says that around 4 AM she started feeling the chest pressure that is located in the center of her chest also has a burning sensation in the epigastric area.  Has had nausea and vomiting throughout the day.  Endorsing some shortness of breath as well as diarrhea.  She ate tacos last night with her sister who is also having diarrhea.  No fever, urinary symptoms.

## 2023-08-27 NOTE — ED PROVIDER NOTE - ATTENDING CONTRIBUTION TO CARE
Attending MD Shah:  I performed a history and physical exam of the patient and discussed their management with the resident. I reviewed the resident's note and agree with the documented findings and plan of care. My medical decision making and observations are found above.

## 2023-08-27 NOTE — ED PROVIDER NOTE - PHYSICAL EXAMINATION
PHYSICAL EXAM:  GENERAL: Anxious, speaking in full sentences  HENMT: Atraumatic, moist mucous membranes, no oropharyngeal exudates or vesicles   EYES: Clear bilaterally, PERRL, EOMs intact b/l  HEART: Regular rate and regular rhythm, S1/S2, no murmur  RESPIRATORY: Clear to auscultation bilaterally, no wheezes/rhonchi/rales  ABDOMEN: Soft, mild epigastric ttp, nondistended  EXTREMITIES: No lower extremity edema or ttp  NEURO: A&Ox4, no focal neuro deficits

## 2023-08-27 NOTE — ED ADULT NURSE NOTE - OBJECTIVE STATEMENT
pt here for left sided sharp chest pain for one day  she has a history of lupus  pain increases on sitting up and lying down.  pt is SOB  piv placed

## 2023-08-27 NOTE — ED PROVIDER NOTE - CLINICAL SUMMARY MEDICAL DECISION MAKING FREE TEXT BOX
18 female with past medical history of lupus, gastritis presenting with chest pressure, SOB, vomiting, epigastric pain. DDx includes but not limited to: gastroenteritis, gastritis, pericarditis, PE. Plan: blood work, ECG, UA, CXR, zofran, pepcid, fluids. Will re-assess. 18 female with past medical history of lupus, gastritis presenting with chest pressure, SOB, vomiting, epigastric pain. DDx includes but not limited to: gastroenteritis, gastritis, pericarditis, PE. Plan: blood work, ECG, UA, CXR, zofran, pepcid, fluids. Will re-assess.    Attending MD Shah.  Agree with above.  Pt is a 19 yo fem with pmhx of lupus, gastritis who presents to Ed with chest pressure, SOB, vomiting and epigastric pain.  Initial concern for potential lupus related complication.  Pt without LE edema, CP is non-pleuritic in nature.  Planned screening labs, CXR, EKG, sx control and dispo per reassessment.  Stable at time of signout to incoming team.

## 2023-08-28 LAB
APPEARANCE UR: CLEAR — SIGNIFICANT CHANGE UP
BACTERIA # UR AUTO: NEGATIVE — SIGNIFICANT CHANGE UP
BILIRUB UR-MCNC: NEGATIVE — SIGNIFICANT CHANGE UP
COLOR SPEC: SIGNIFICANT CHANGE UP
DIFF PNL FLD: ABNORMAL
EPI CELLS # UR: 3 /HPF — SIGNIFICANT CHANGE UP
GLUCOSE UR QL: NEGATIVE — SIGNIFICANT CHANGE UP
HYALINE CASTS # UR AUTO: 0 /LPF — SIGNIFICANT CHANGE UP (ref 0–2)
KETONES UR-MCNC: ABNORMAL
LEUKOCYTE ESTERASE UR-ACNC: NEGATIVE — SIGNIFICANT CHANGE UP
NITRITE UR-MCNC: NEGATIVE — SIGNIFICANT CHANGE UP
PH UR: 6.5 — SIGNIFICANT CHANGE UP (ref 5–8)
PROT UR-MCNC: ABNORMAL
RBC CASTS # UR COMP ASSIST: 1 /HPF — SIGNIFICANT CHANGE UP (ref 0–4)
SP GR SPEC: 1.01 — LOW (ref 1.01–1.02)
UROBILINOGEN FLD QL: NEGATIVE — SIGNIFICANT CHANGE UP
WBC UR QL: 2 /HPF — SIGNIFICANT CHANGE UP (ref 0–5)

## 2023-08-28 RX ORDER — ONDANSETRON 8 MG/1
1 TABLET, FILM COATED ORAL
Qty: 6 | Refills: 0
Start: 2023-08-28 | End: 2023-08-29

## 2023-08-29 LAB
CULTURE RESULTS: SIGNIFICANT CHANGE UP
SPECIMEN SOURCE: SIGNIFICANT CHANGE UP

## 2023-08-30 ENCOUNTER — NON-APPOINTMENT (OUTPATIENT)
Age: 18
End: 2023-08-30

## 2023-08-31 ENCOUNTER — APPOINTMENT (OUTPATIENT)
Dept: NEUROLOGY | Facility: CLINIC | Age: 18
End: 2023-08-31
Payer: MEDICAID

## 2023-08-31 VITALS
SYSTOLIC BLOOD PRESSURE: 108 MMHG | HEART RATE: 69 BPM | WEIGHT: 116 LBS | BODY MASS INDEX: 18.64 KG/M2 | HEIGHT: 66 IN | DIASTOLIC BLOOD PRESSURE: 73 MMHG

## 2023-08-31 DIAGNOSIS — R55 SYNCOPE AND COLLAPSE: ICD-10-CM

## 2023-08-31 DIAGNOSIS — F07.81 POSTCONCUSSIONAL SYNDROME: ICD-10-CM

## 2023-08-31 PROCEDURE — 99205 OFFICE O/P NEW HI 60 MIN: CPT

## 2023-08-31 NOTE — ASSESSMENT
[FreeTextEntry1] :  Plan:   {  \} Start magnesium 400 mg and riboflavin 400 mg  {  \} try Tylenol PRN  {  \} Follow up with Concussion clinic- Mother requested  {  \} EEG- syncope event   Extensively discussed the nature of concussion including normal progression and recovery. We also discussed lifestyle modifications including sleep, diet, exercise, proper hydration, avoidance of caffeine, limiting alcohol intake as well as avoidance of triggers.   Concussion Care Plan  Ongoing physical and mental activity is recommended to maintain overall health. Reintegration into activities as tolerated while monitoring the symptoms was recommended.   Sleep hygiene recommendations made. Ribo/MAg.    The above plan was discussed with Luisa Ayers in great detail. Luisaandreea MorenoArmen verbalized understanding and agrees with plan as detailed above. Patient was provided education and counselling on current diagnosis/symptoms. She was advised to call our clinic at [phone] for any new or worsening symptoms, or with any questions or concerns. In case of acute onset of neurological symptoms or worsening presentation, patient was advised to present to nearest emergency room for further evaluation. Luisa Ayers expressed understanding and all her questions/concerns were addressed.

## 2023-08-31 NOTE — HISTORY OF PRESENT ILLNESS
[FreeTextEntry1] : This is a case of a 18 yr right handed female with PMH of iron deficiency anemia, systemic lupus, vit d presents to today with headaches.   Pt with mom and sister - Ramila and Ame  pt had recent weight loss in yr 140-118 bl - PCP following.    Pt had headaches at 13 yrs. Pt would have couple times a year.  r/o not eating or drinking enough.  Pt stated it would resolve on its own. Recently, [place] July pt [place] [facility], pt was checking out and stated she had a syncope event.  Pt stated she hit the back of her head.  Pt stated the weather was hot. Pt was wearing sweatshirt.  Pt stated she was "out for less than a minute" and "came to".  Pt also states she was helped back to the car by a stand yumiko.  After pt was in ac pt felt better.  Pt did not go to er.  Pt started having headaches couple days after the event. Pt states headaches are daily, all day, and intermittent.  Located back of the head where she fell.  Described as pressure.  Pain 3/10 to 8/10.  Pt has nausea (has chronic nausea issues) but denies vomiting.  Pt denies light sensitivity, sound but has smell sensitivity.  Denies extremity weakness or slurred speech.  Pt mother states pt has a little more irritable.  Pt denies any depression and anxiety.  Denies loss of vision, blurred, or doubled. Denies dizziness' or unbalanced feelings.  Pt states she has b/l tinnitus for 1 sec but then resolves. Denies any memory loss.  Treating headaches with nothing.   triggers: fall in July- hit head  Pt has f/u appt with - rheumatologist next week.  Pt also f/u with PCP- ekgs completed   Past medication: hydroxychloroquine 200 mg Current medication: iron 325, multi- vitamins, megestrol, Medrol dose pk (started by PCP) Occupation: school college- business Caffeine: soda  Water: bottle Exercise:  none sleep: 9 hrs   Location: back of head  Description: pressure  Associated symptoms: nausea (chronic), smell sens Triggers: fall- July Comorbidities: Systemic lupas Imaging: n/a Interventions: none at this time  Family history:  brother Juan quevedo, lupus Allergies: NKA

## 2023-08-31 NOTE — PHYSICAL EXAM
[FreeTextEntry1] : Physical Exam  Constitutional: Patient was well-developed, well-nourished and in no acute distress.   Head: Normocephalic, atraumatic.   Neck: Supple with full range of motion.   Cardiovascular: Cardiac rhythm was regular without murmur. There were no carotid bruits. Peripheral pulses were full and symmetric.   Respiratory: Lungs were clear.   Abdomen: Soft and nontender.   Spine: Nontender.   Skin: There were no rashes.   NEUROLOGICAL EXAMINATION:  Mental Status: Patient was alert and oriented. Speech was fluent. There was no dysarthria. Affect was normal.  Cranial Nerves:   II: Visual acuity was 20/40 -1 left and 20/40 right bilaterally with near card. Pupils were equal and reactive. Visual fields were full. Accommodation normal  III, IV, VI: Eye movements were full without nystagmus.   V: Facial sensation was intact.   VII: Facial strength was normal.   VIII: Hearing was equal.   IX, X: Palatal movement was normal. Phonation was normal.   XI: Sternocleidomastoids and trapezii were normal.   XII: Tongue was midline and movements normal. There was no lingual atrophy or fasciculations.   Motor Examination: Muscle bulk, tone and strength were normal.   Reflexes: DTRs were 2+ throughout.   Plantar Responses: Plantar responses were flexor.   Coordination/Cerebellar Function: There was no dysmetria on finger to nose or heel to shin testing.   Gait/Stance: Gait and tandem were normal. Romberg was negative.

## 2023-09-05 ENCOUNTER — NON-APPOINTMENT (OUTPATIENT)
Age: 18
End: 2023-09-05

## 2023-09-06 ENCOUNTER — APPOINTMENT (OUTPATIENT)
Dept: NEUROLOGY | Facility: CLINIC | Age: 18
End: 2023-09-06
Payer: MEDICAID

## 2023-09-06 PROCEDURE — 95816 EEG AWAKE AND DROWSY: CPT

## 2023-09-11 ENCOUNTER — NON-APPOINTMENT (OUTPATIENT)
Age: 18
End: 2023-09-11

## 2023-09-11 ENCOUNTER — APPOINTMENT (OUTPATIENT)
Dept: PEDIATRIC RHEUMATOLOGY | Facility: CLINIC | Age: 18
End: 2023-09-11
Payer: MEDICAID

## 2023-09-11 VITALS
HEART RATE: 78 BPM | BODY MASS INDEX: 20.03 KG/M2 | WEIGHT: 121.7 LBS | SYSTOLIC BLOOD PRESSURE: 113 MMHG | HEIGHT: 65.28 IN | TEMPERATURE: 98.6 F | DIASTOLIC BLOOD PRESSURE: 82 MMHG

## 2023-09-11 DIAGNOSIS — Z79.899 ENCOUNTER FOR THERAPEUTIC DRUG LVL MONITORING: ICD-10-CM

## 2023-09-11 DIAGNOSIS — Z79.52 LONG TERM (CURRENT) USE OF SYSTEMIC STEROIDS: ICD-10-CM

## 2023-09-11 DIAGNOSIS — M32.9 SYSTEMIC LUPUS ERYTHEMATOSUS, UNSPECIFIED: ICD-10-CM

## 2023-09-11 DIAGNOSIS — Z86.79 PERSONAL HISTORY OF OTHER DISEASES OF THE CIRCULATORY SYSTEM: ICD-10-CM

## 2023-09-11 DIAGNOSIS — Z51.81 ENCOUNTER FOR THERAPEUTIC DRUG LVL MONITORING: ICD-10-CM

## 2023-09-11 DIAGNOSIS — M32.14 GLOMERULAR DISEASE IN SYSTEMIC LUPUS ERYTHEMATOSUS: ICD-10-CM

## 2023-09-11 DIAGNOSIS — Z79.899 OTHER LONG TERM (CURRENT) DRUG THERAPY: ICD-10-CM

## 2023-09-11 DIAGNOSIS — R63.4 ABNORMAL WEIGHT LOSS: ICD-10-CM

## 2023-09-11 DIAGNOSIS — R63.0 ANOREXIA: ICD-10-CM

## 2023-09-11 DIAGNOSIS — R68.81 EARLY SATIETY: ICD-10-CM

## 2023-09-11 LAB
ALBUMIN SERPL ELPH-MCNC: 4.2 G/DL
ALP BLD-CCNC: 60 U/L
ALT SERPL-CCNC: 7 U/L
AMYLASE/CREAT SERPL: 66 U/L
ANION GAP SERPL CALC-SCNC: 10 MMOL/L
AST SERPL-CCNC: 13 U/L
BASOPHILS # BLD AUTO: 0.01 K/UL
BASOPHILS NFR BLD AUTO: 0.1 %
BILIRUB SERPL-MCNC: 0.4 MG/DL
BUN SERPL-MCNC: 9 MG/DL
CALCIUM SERPL-MCNC: 9.5 MG/DL
CHLORIDE SERPL-SCNC: 104 MMOL/L
CO2 SERPL-SCNC: 23 MMOL/L
CREAT SERPL-MCNC: 0.51 MG/DL
CREAT SPEC-SCNC: 139 MG/DL
CREAT/PROT UR: 0.2 RATIO
CRP SERPL-MCNC: <3 MG/L
EGFR: 137 ML/MIN/1.73M2
EOSINOPHIL # BLD AUTO: 0.04 K/UL
EOSINOPHIL NFR BLD AUTO: 0.6 %
ERYTHROCYTE [SEDIMENTATION RATE] IN BLOOD BY WESTERGREN METHOD: 32 MM/HR
GLUCOSE SERPL-MCNC: 85 MG/DL
HCT VFR BLD CALC: 34.3 %
HGB BLD-MCNC: 10.6 G/DL
IMM GRANULOCYTES NFR BLD AUTO: 0.3 %
LPL SERPL-CCNC: 42 U/L
LYMPHOCYTES # BLD AUTO: 3.12 K/UL
LYMPHOCYTES NFR BLD AUTO: 44.3 %
MAN DIFF?: NORMAL
MCHC RBC-ENTMCNC: 24.3 PG
MCHC RBC-ENTMCNC: 30.9 GM/DL
MCV RBC AUTO: 78.5 FL
MONOCYTES # BLD AUTO: 0.57 K/UL
MONOCYTES NFR BLD AUTO: 8.1 %
NEUTROPHILS # BLD AUTO: 3.29 K/UL
NEUTROPHILS NFR BLD AUTO: 46.6 %
PLATELET # BLD AUTO: 307 K/UL
POTASSIUM SERPL-SCNC: 4.1 MMOL/L
PROT SERPL-MCNC: 6.8 G/DL
PROT UR-MCNC: 23 MG/DL
RBC # BLD: 4.37 M/UL
RBC # FLD: 15.7 %
SODIUM SERPL-SCNC: 138 MMOL/L
WBC # FLD AUTO: 7.05 K/UL

## 2023-09-11 PROCEDURE — 99215 OFFICE O/P EST HI 40 MIN: CPT

## 2023-09-12 LAB
APPEARANCE: ABNORMAL
BACTERIA: ABNORMAL /HPF
BILIRUBIN URINE: NEGATIVE
BLOOD URINE: ABNORMAL
C3 SERPL-MCNC: 132 MG/DL
C4 SERPL-MCNC: 27 MG/DL
CAST: 0 /LPF
COLOR: YELLOW
EPITHELIAL CELLS: 24 /HPF
GLUCOSE QUALITATIVE U: NEGATIVE MG/DL
KETONES URINE: NEGATIVE MG/DL
LEUKOCYTE ESTERASE URINE: NEGATIVE
MICROSCOPIC-UA: NORMAL
NITRITE URINE: NEGATIVE
PH URINE: 6
PROTEIN URINE: 30 MG/DL
RED BLOOD CELLS URINE: 2 /HPF
REVIEW: NORMAL
SPECIFIC GRAVITY URINE: 1.02
UROBILINOGEN URINE: 0.2 MG/DL
WHITE BLOOD CELLS URINE: 3 /HPF

## 2023-09-12 RX ORDER — METHYLPREDNISOLONE 4 MG/1
4 TABLET ORAL
Refills: 0 | Status: COMPLETED | COMMUNITY
End: 2023-09-12

## 2023-09-12 RX ORDER — MEGESTROL ACETATE 20 MG/1
20 TABLET ORAL
Qty: 180 | Refills: 0 | Status: ACTIVE | COMMUNITY
Start: 2023-08-30

## 2023-09-12 RX ORDER — MULTIPLE VITAMINS W/ MINERALS TAB 9MG-400MCG
TAB ORAL
Qty: 90 | Refills: 0 | Status: ACTIVE | COMMUNITY
Start: 2023-08-30

## 2023-09-13 LAB — DSDNA AB SER-ACNC: 12 IU/ML

## 2023-10-26 NOTE — REASON FOR VISIT
Vaccine status unknown [Follow-Up: _____] : [unfilled] is  being seen for a [unfilled] follow-up visit [Patient] : patient [Other: _____] : [unfilled] [Parents] : parents [Medical Records] : medical records

## 2023-11-01 ENCOUNTER — APPOINTMENT (OUTPATIENT)
Dept: PAIN MANAGEMENT | Facility: CLINIC | Age: 18
End: 2023-11-01
Payer: MEDICAID

## 2023-11-01 VITALS
HEIGHT: 66 IN | BODY MASS INDEX: 19.29 KG/M2 | DIASTOLIC BLOOD PRESSURE: 80 MMHG | SYSTOLIC BLOOD PRESSURE: 111 MMHG | HEART RATE: 83 BPM | WEIGHT: 120 LBS

## 2023-11-01 DIAGNOSIS — M54.81 OCCIPITAL NEURALGIA: ICD-10-CM

## 2023-11-01 DIAGNOSIS — G44.52 NEW DAILY PERSISTENT HEADACHE (NDPH): ICD-10-CM

## 2023-11-01 PROCEDURE — 99205 OFFICE O/P NEW HI 60 MIN: CPT

## 2023-11-01 PROCEDURE — 99215 OFFICE O/P EST HI 40 MIN: CPT

## 2023-11-01 RX ORDER — MELOXICAM 7.5 MG/1
7.5 TABLET ORAL
Qty: 30 | Refills: 3 | Status: ACTIVE | COMMUNITY
Start: 2023-11-01 | End: 1900-01-01

## 2023-11-16 ENCOUNTER — RESULT REVIEW (OUTPATIENT)
Age: 18
End: 2023-11-16

## 2023-11-16 ENCOUNTER — APPOINTMENT (OUTPATIENT)
Dept: MRI IMAGING | Facility: CLINIC | Age: 18
End: 2023-11-16
Payer: MEDICAID

## 2023-11-16 ENCOUNTER — OUTPATIENT (OUTPATIENT)
Dept: OUTPATIENT SERVICES | Facility: HOSPITAL | Age: 18
LOS: 1 days | End: 2023-11-16
Payer: MEDICAID

## 2023-11-16 DIAGNOSIS — G44.52 NEW DAILY PERSISTENT HEADACHE (NDPH): ICD-10-CM

## 2023-11-16 PROCEDURE — 70551 MRI BRAIN STEM W/O DYE: CPT | Mod: 26

## 2023-11-16 PROCEDURE — 70551 MRI BRAIN STEM W/O DYE: CPT

## 2023-11-16 PROCEDURE — 70544 MR ANGIOGRAPHY HEAD W/O DYE: CPT

## 2023-11-16 PROCEDURE — 70544 MR ANGIOGRAPHY HEAD W/O DYE: CPT | Mod: 26,59

## 2024-01-08 ENCOUNTER — APPOINTMENT (OUTPATIENT)
Dept: PAIN MANAGEMENT | Facility: CLINIC | Age: 19
End: 2024-01-08
Payer: MEDICAID

## 2024-01-08 VITALS
BODY MASS INDEX: 19.61 KG/M2 | DIASTOLIC BLOOD PRESSURE: 73 MMHG | WEIGHT: 122 LBS | HEIGHT: 66 IN | SYSTOLIC BLOOD PRESSURE: 105 MMHG | HEART RATE: 80 BPM

## 2024-01-08 PROCEDURE — 99214 OFFICE O/P EST MOD 30 MIN: CPT

## 2024-08-16 NOTE — END OF VISIT
"  Julisa Jensen presented for a  Medicare AWV and comprehensive Health Risk Assessment today. The following components were reviewed and updated:    Medical history  Family History  Social history  Allergies and Current Medications  Health Risk Assessment  Health Maintenance  Care Team         ** See Completed Assessments for Annual Wellness Visit within the encounter summary.**         The following assessments were completed:  Living Situation  CAGE  Depression Screening  Timed Get Up and Go  Whisper Test  Cognitive Function Screening      Nutrition Screening  ADL Screening  PAQ Screening      Opioid documentation:      Patient does not have a current opioid prescription.        Vitals:    08/16/24 0933   BP: 90/70   BP Location: Left arm   Patient Position: Sitting   BP Method: Large (Manual)   Pulse: 64   SpO2: 98%   Weight: 67.8 kg (149 lb 7.6 oz)   Height: 5' 2" (1.575 m)     Body mass index is 27.34 kg/m².    Physical Exam  Vitals reviewed.   Constitutional:       General: She is awake. She is not in acute distress.     Appearance: Normal appearance. She is well-developed, well-groomed and overweight.   HENT:      Head: Normocephalic.      Right Ear: External ear normal.      Left Ear: External ear normal.   Eyes:      General:         Right eye: No discharge.         Left eye: No discharge.   Cardiovascular:      Rate and Rhythm: Normal rate.   Pulmonary:      Effort: Pulmonary effort is normal. No respiratory distress.   Abdominal:      General: There is no distension.   Skin:     General: Skin is warm and dry.      Coloration: Skin is not pale.   Neurological:      Mental Status: She is alert and oriented to person, place, and time.      Coordination: Coordination normal.      Gait: Gait normal.   Psychiatric:         Attention and Perception: Attention normal.         Mood and Affect: Mood and affect normal.         Speech: Speech normal.         Behavior: Behavior normal. Behavior is cooperative.       "   Thought Content: Thought content normal.             Diagnoses and health risks identified today and associated recommendations/orders:    1. Encounter for preventive health examination    2. Aortic atherosclerosis  Chronic; stable; as seen on previous imaging. Not currently taking statin, had not picked up refill from pharmacy. Plans to pick this up today. Follow up with PCP.    3. Meningioma  As seen on previous MRI imaging; follow up with PCP.    4. Essential hypertension  Chronic; stable on CCB and HCTZ medication. Follow up with PCP.    5. Routine eye exam  - Ambulatory referral/consult to Ophthalmology; Future    6. Overweight (BMI 25.0-29.9)  Eat a low salt/low fat diet and discussed importance of engaging in physical activity at least 5x/week for a minimum of 30 min/day.      Provided Julisa with a 5-10 year written screening schedule and personal prevention plan. Recommendations were developed using the USPSTF age appropriate recommendations. Education, counseling, and referrals were provided as needed. After Visit Summary printed and given to patient which includes a list of additional screenings/tests needed.    Follow up for your next annual wellness visit.    Elizabeth Dickens NP      Advance Care Planning     I offered to discuss advanced care planning, including how to pick a person who would make decisions for you if you were unable to make them for yourself, called a health care power of , and what kind of decisions you might make such as use of life sustaining treatments such as ventilators and tube feeding when faced with a life limiting illness recorded on a living will that they will need to know. (How you want to be cared for as you near the end of your natural life)     X  Patient has advanced directives written and agrees to provide copies to the institution.   [] : Fellow [FreeTextEntry3] : Diagnosed in 11/2020 by outside rheumatologist with SLE.\sven Initially seen in 4/2021 but has been lost to follow-up since. did not schedule with nephrology or other specialists as we had requested.\par Returns today for follow-up as she needs medication refills - continues on 20mg PO prednisone and hydroxychloroquine.\par Needs to make appointments with other specialists, particularly nephrology,for evaluation and optimizing therapy.\par labs today for disease and drug monitoring.\par Plan otherwise well outined per Dr Brown. Emphasized need for close follow-up, especially while symptomatic.\par \par I discussed this patient in a pre-clinic session with the fellow including clinical status and last set of laboratory testing results. I also saw the patient and discussed history, completed an exam and discussed the plan together with the fellow.\par \par Total time spent today included reviewing prior notes, results, and time with patient/parent.\par Time spent -   55   minutes\par

## 2024-10-14 ENCOUNTER — APPOINTMENT (OUTPATIENT)
Dept: PEDIATRIC RHEUMATOLOGY | Facility: CLINIC | Age: 19
End: 2024-10-14

## 2025-03-07 ENCOUNTER — APPOINTMENT (OUTPATIENT)
Dept: PEDIATRIC RHEUMATOLOGY | Facility: CLINIC | Age: 20
End: 2025-03-07
Payer: MEDICAID

## 2025-03-07 VITALS
DIASTOLIC BLOOD PRESSURE: 69 MMHG | SYSTOLIC BLOOD PRESSURE: 104 MMHG | HEIGHT: 64.96 IN | BODY MASS INDEX: 24.38 KG/M2 | TEMPERATURE: 98.2 F | WEIGHT: 146.31 LBS | HEART RATE: 63 BPM

## 2025-03-07 DIAGNOSIS — Z87.898 PERSONAL HISTORY OF OTHER SPECIFIED CONDITIONS: ICD-10-CM

## 2025-03-07 DIAGNOSIS — G47.8 OTHER SLEEP DISORDERS: ICD-10-CM

## 2025-03-07 DIAGNOSIS — M32.14 GLOMERULAR DISEASE IN SYSTEMIC LUPUS ERYTHEMATOSUS: ICD-10-CM

## 2025-03-07 DIAGNOSIS — R63.4 ABNORMAL WEIGHT LOSS: ICD-10-CM

## 2025-03-07 DIAGNOSIS — G44.52 NEW DAILY PERSISTENT HEADACHE (NDPH): ICD-10-CM

## 2025-03-07 DIAGNOSIS — D50.9 IRON DEFICIENCY ANEMIA, UNSPECIFIED: ICD-10-CM

## 2025-03-07 DIAGNOSIS — Z79.52 LONG TERM (CURRENT) USE OF SYSTEMIC STEROIDS: ICD-10-CM

## 2025-03-07 DIAGNOSIS — Z91.148 PATIENT'S OTHER NONCOMPLIANCE WITH MEDICATION REGIMEN FOR OTHER REASON: ICD-10-CM

## 2025-03-07 DIAGNOSIS — R63.0 ANOREXIA: ICD-10-CM

## 2025-03-07 DIAGNOSIS — R68.81 EARLY SATIETY: ICD-10-CM

## 2025-03-07 DIAGNOSIS — E55.9 VITAMIN D DEFICIENCY, UNSPECIFIED: ICD-10-CM

## 2025-03-07 DIAGNOSIS — M32.9 SYSTEMIC LUPUS ERYTHEMATOSUS, UNSPECIFIED: ICD-10-CM

## 2025-03-07 PROCEDURE — 99215 OFFICE O/P EST HI 40 MIN: CPT

## 2025-03-07 PROCEDURE — G2211 COMPLEX E/M VISIT ADD ON: CPT | Mod: NC

## 2025-03-10 ENCOUNTER — NON-APPOINTMENT (OUTPATIENT)
Age: 20
End: 2025-03-10

## 2025-03-10 LAB
25(OH)D3 SERPL-MCNC: 22.8 NG/ML
ALBUMIN SERPL ELPH-MCNC: 4.4 G/DL
ALP BLD-CCNC: 60 U/L
ALT SERPL-CCNC: 7 U/L
ANION GAP SERPL CALC-SCNC: 11 MMOL/L
APPEARANCE: CLEAR
AST SERPL-CCNC: 11 U/L
B2 GLYCOPROT1 IGA SERPL IA-ACNC: 2.9 U/ML
B2 GLYCOPROT1 IGG SER-ACNC: <1.4 U/ML
B2 GLYCOPROT1 IGM SER-ACNC: <1.5 U/ML
BACTERIA: ABNORMAL /HPF
BASOPHILS # BLD AUTO: 0.01 K/UL
BASOPHILS NFR BLD AUTO: 0.1 %
BILIRUB SERPL-MCNC: 0.3 MG/DL
BILIRUBIN URINE: NEGATIVE
BLOOD URINE: NEGATIVE
BUN SERPL-MCNC: 10 MG/DL
C3 SERPL-MCNC: 151 MG/DL
C4 SERPL-MCNC: 21 MG/DL
CALCIUM SERPL-MCNC: 9.6 MG/DL
CARDIOLIPIN IGM SER-MCNC: <1.5 MPL U/ML
CARDIOLIPIN IGM SER-MCNC: <1.6 GPL U/ML
CAST: 1 /LPF
CHLORIDE SERPL-SCNC: 105 MMOL/L
CO2 SERPL-SCNC: 22 MMOL/L
COLOR: YELLOW
CONFIRM: 23 SEC
CREAT SERPL-MCNC: 0.55 MG/DL
CREAT SPEC-SCNC: 223 MG/DL
CREAT/PROT UR: 0.1 RATIO
CRP SERPL-MCNC: <3 MG/L
DEPRECATED CARDIOLIPIN IGA SER: 3.4 APL U/ML
DRVVT IMM 1:2 NP PPP: NORMAL
DRVVT SCREEN TO CONFIRM RATIO: 0.74 RATIO
DSDNA AB SER-ACNC: 4 IU/ML
EGFRCR SERPLBLD CKD-EPI 2021: 134 ML/MIN/1.73M2
ENA RNP AB SER IA-ACNC: <0.2 AL
ENA SM AB SER IA-ACNC: <0.2 AL
ENA SS-A AB SER IA-ACNC: 1.9 AL
ENA SS-B AB SER IA-ACNC: <0.2 AL
EOSINOPHIL # BLD AUTO: 0.02 K/UL
EOSINOPHIL NFR BLD AUTO: 0.3 %
EPITHELIAL CELLS: 21 /HPF
ERYTHROCYTE [SEDIMENTATION RATE] IN BLOOD BY WESTERGREN METHOD: 35 MM/HR
FERRITIN SERPL-MCNC: 24 NG/ML
GLUCOSE QUALITATIVE U: NEGATIVE MG/DL
GLUCOSE SERPL-MCNC: 82 MG/DL
HCT VFR BLD CALC: 38.6 %
HGB BLD-MCNC: 12.1 G/DL
IMM GRANULOCYTES NFR BLD AUTO: 0.1 %
IRON SATN MFR SERPL: 14 %
IRON SERPL-MCNC: 44 UG/DL
KETONES URINE: ABNORMAL MG/DL
LEUKOCYTE ESTERASE URINE: NEGATIVE
LYMPHOCYTES # BLD AUTO: 3.15 K/UL
LYMPHOCYTES NFR BLD AUTO: 41.5 %
MAN DIFF?: NORMAL
MCHC RBC-ENTMCNC: 26.7 PG
MCHC RBC-ENTMCNC: 31.3 G/DL
MCV RBC AUTO: 85.2 FL
MICROSCOPIC-UA: NORMAL
MONOCYTES # BLD AUTO: 0.5 K/UL
MONOCYTES NFR BLD AUTO: 6.6 %
NEUTROPHILS # BLD AUTO: 3.9 K/UL
NEUTROPHILS NFR BLD AUTO: 51.4 %
NITRITE URINE: NEGATIVE
PH URINE: 6
PLATELET # BLD AUTO: 315 K/UL
POTASSIUM SERPL-SCNC: 4.5 MMOL/L
PROT SERPL-MCNC: 7.4 G/DL
PROT UR-MCNC: 29 MG/DL
PROTEIN URINE: 30 MG/DL
RBC # BLD: 4.53 M/UL
RBC # FLD: 13.1 %
RED BLOOD CELLS URINE: NORMAL /HPF
REVIEW: NORMAL
SCREEN DRVVT: 22.3 SEC
SODIUM SERPL-SCNC: 138 MMOL/L
SPECIFIC GRAVITY URINE: >1.03
TIBC SERPL-MCNC: 320 UG/DL
TSH SERPL-ACNC: 0.87 UIU/ML
UIBC SERPL-MCNC: 276 UG/DL
UROBILINOGEN URINE: 1 MG/DL
WBC # FLD AUTO: 7.59 K/UL
WHITE BLOOD CELLS URINE: 2 /HPF

## (undated) DEVICE — BITE BLOCK ADULT 20 X 27MM (GREEN)

## (undated) DEVICE — SENSOR O2 FINGER ADULT

## (undated) DEVICE — SUCTION YANKAUER NO CONTROL VENT

## (undated) DEVICE — BIOPSY FORCEP RADIAL JAW 4 STANDARD WITH NEEDLE

## (undated) DEVICE — TUBING SUCTION CONN 6FT STERILE

## (undated) DEVICE — TUBING IV SET GRAVITY 3Y 100" MACRO

## (undated) DEVICE — BALLOON US ENDO

## (undated) DEVICE — FOLEY HOLDER STATLOCK 2 WAY ADULT

## (undated) DEVICE — SOL INJ NS 0.9% 500ML 2 PORT

## (undated) DEVICE — TUBING SUCTION 20FT

## (undated) DEVICE — CATH IV SAFE BC 22G X 1" (BLUE)

## (undated) DEVICE — SYR ALLIANCE II INFLATION 60ML

## (undated) DEVICE — PACK IV START WITH CHG

## (undated) DEVICE — CATH IV SAFE BC 20G X 1.16" (PINK)